# Patient Record
Sex: MALE | HISPANIC OR LATINO | Employment: FULL TIME | URBAN - METROPOLITAN AREA
[De-identification: names, ages, dates, MRNs, and addresses within clinical notes are randomized per-mention and may not be internally consistent; named-entity substitution may affect disease eponyms.]

---

## 2018-01-15 ENCOUNTER — APPOINTMENT (OUTPATIENT)
Dept: RADIOLOGY | Facility: IMAGING CENTER | Age: 53
End: 2018-01-15
Attending: PHYSICIAN ASSISTANT
Payer: COMMERCIAL

## 2018-01-15 ENCOUNTER — OCCUPATIONAL MEDICINE (OUTPATIENT)
Dept: URGENT CARE | Facility: CLINIC | Age: 53
End: 2018-01-15
Payer: COMMERCIAL

## 2018-01-15 VITALS
OXYGEN SATURATION: 98 % | BODY MASS INDEX: 29.02 KG/M2 | RESPIRATION RATE: 16 BRPM | HEIGHT: 64 IN | TEMPERATURE: 97.9 F | HEART RATE: 108 BPM | WEIGHT: 170 LBS

## 2018-01-15 DIAGNOSIS — Z02.89 ENCOUNTER FOR OCCUPATIONAL HEALTH ASSESSMENT: ICD-10-CM

## 2018-01-15 DIAGNOSIS — S96.911A STRAIN OF RIGHT ANKLE, INITIAL ENCOUNTER: ICD-10-CM

## 2018-01-15 DIAGNOSIS — S99.911A INJURY OF RIGHT ANKLE, INITIAL ENCOUNTER: ICD-10-CM

## 2018-01-15 LAB
AMP AMPHETAMINE: NORMAL
BREATH ALCOHOL COMMENT: NORMAL
COC COCAINE: NORMAL
INT CON NEG: NORMAL
INT CON POS: NORMAL
MET METHAMPHETAMINES: NORMAL
OPI OPIATES: NORMAL
PCP PHENCYCLIDINE: NORMAL
POC BREATHALIZER: 0 PERCENT (ref 0–0.01)
POC DRUG COMMENT 753798-OCCUPATIONAL HEALTH: NORMAL
THC: NORMAL

## 2018-01-15 PROCEDURE — 99204 OFFICE O/P NEW MOD 45 MIN: CPT | Mod: 29 | Performed by: PHYSICIAN ASSISTANT

## 2018-01-15 PROCEDURE — 80305 DRUG TEST PRSMV DIR OPT OBS: CPT | Mod: 29 | Performed by: PHYSICIAN ASSISTANT

## 2018-01-15 PROCEDURE — 73610 X-RAY EXAM OF ANKLE: CPT | Mod: TC,FY,RT | Performed by: PHYSICIAN ASSISTANT

## 2018-01-15 PROCEDURE — 82075 ASSAY OF BREATH ETHANOL: CPT | Mod: 29 | Performed by: PHYSICIAN ASSISTANT

## 2018-01-15 ASSESSMENT — ENCOUNTER SYMPTOMS
SENSORY CHANGE: 0
ROS SKIN COMMENTS: NO REDNESS OR BRUISING
FOCAL WEAKNESS: 0
FEVER: 0
TINGLING: 0
CHILLS: 0

## 2018-01-15 ASSESSMENT — PAIN SCALES - GENERAL: PAINLEVEL: 8=MODERATE-SEVERE PAIN

## 2018-01-15 NOTE — PATIENT INSTRUCTIONS
Esguince de tobillo  (Ankle Sprain)   Un esguince de tobillo es ida lesión en los tejidos lucy y fibrosos (ligamentos) que mantienen unidos los huesos de la articulación del tobillo.   CAUSAS   Las causas pueden ser ida caída o la torcedura del tobillo. Los esguinces de tobillo ocurren con más frecuencia al pisar con el borde exterior del pie, lo que hace que el tobillo se vuelva hacia adentro. Las personas que practican deportes son más propensas a justin tipo de lesiones.   SÍNTOMAS   · Dolor en el tobillo. El dolor puede aparecer gaetano el reposo o sólo al tratar de ponerse de pie o caminar.  · Hinchazón.  · Hematomas. Los hematomas pueden aparecer inmediatamente o luego de 1 a 2 días después de la lesión.  · Dificultad para pararse o caminar, especialmente al doblar en esquinas o al cambiar de dirección.  DIAGNÓSTICO   El médico le preguntará detalles acerca de la lesión y le hará un examen físico del tobillo para determinar si tiene un esguince. Gaetano el examen físico, el médico apretará y aplicará presión en áreas específicas del pie y del tobillo. El médico tratará de  el tobillo en ciertas direcciones. Le indicarán ida radiografía para descartar la fractura de un hueso o que un ligamento no se haya separado de mimi de los huesos del tobillo (fractura por avulsión).   TRATAMIENTO   Algunos tipos de soporte podrán ayudarlo a estabilizar el tobillo. El profesional que lo asiste le dará las indicaciones. También podrá indicarle que use medicamentos para calmar el dolor. Si el esguince es grave, langley médico podrá derivarlo a un cirujano que lo ayudará a recuperar la función de las partes afectadas del sistema esquelético (ortopedista) o a un fisioterapeuta.   INSTRUCCIONES PARA EL CUIDADO EN EL HOGAR   · Aplique hielo en la articulación lesionada gaetano 1 ó 2 días o según lo que le indique langley médico. La aplicación del hielo ayuda a reducir la inflamación y el dolor.  ¨ Ponga el hielo en ida bolsa  plástica.  ¨ Colóquese ida toalla entre la piel y la bolsa de hielo.  ¨ Deje el hielo en el lugar gaetano 15 a 20 minutos por vez, cada 2 horas mientras esté despierto.  · Sólo tome medicamentos de venta laila o recetados para calmar el dolor, las molestias o bajar la fiebre según las indicaciones de langley médico.  · Eleve el tobillo lesionado por encima del nivel del corazón tanto lulú pueda gaetano 2 o 3 días.  · Si langley médico le indica el uso de muletas, úselas según las instrucciones. Gradualmente lleve el peso sobre el tobillo afectado. Siga usando muletas o un bastón hasta que pueda caminar sin sentir dolor en el tobillo.  · Si tiene ida férula de yeso, úsela lulú lo indique langley médico. No se apoye en ninguna cosa más dura que ida almohada gaetano las primeras 24 horas. No ponga peso sobre la férula. No permita que se moje. Puede quitársela para ronal ida ducha o un baño.  · Pueden haberle colocado un vendaje elástico para usar alrededor del tobillo para darle soporte. Si el vendaje elástico está muy ajustado (siente adormecimiento u hormigueo o el pie está frío y nain), ajústelo para que sea más cómodo.  · Si usted tiene ida férula de aire, puede soplar o dejar salir el aire para que sea más cómodo. Puede quitarse la férula por la noche y antes de ronal ida ducha o un baño. Mueva los dedos de los pies en la férula varias veces al día para disminuir la hinchazón.  SOLICITE ATENCIÓN MÉDICA SI:   · Le aumenta rápidamente el moretón o el hinchazón.  · Los dedos de los pies están extremadamente fríos o pierde la sensibilidad en el pie.  · El dolor no se alfred con los medicamentos.  SOLICITE ATENCIÓN MÉDICA DE INMEDIATO SI:   · Los dedos de los pies están adormecidos o de color nain.  · Tiene un dolor marcel que va aumentando.  ASEGÚRESE DE QUE:   · Comprende estas instrucciones.  · Controlará langley enfermedad.  · Solicitará ayuda de inmediato si no mejora o empeora.     Esta información no tiene lulú fin reemplazar el  consejo del médico. Asegúrese de hacerle al médico cualquier pregunta que tenga.     Document Released: 12/18/2006 Document Revised: 09/11/2013  Elsevier Interactive Patient Education ©2016 Elsevier Inc.

## 2018-01-15 NOTE — LETTER
"EMPLOYEE’S CLAIM FOR COMPENSATION/ REPORT OF INITIAL TREATMENT  FORM C-4    EMPLOYEE’S CLAIM - PROVIDE ALL INFORMATION REQUESTED   First Name  Philip Last Name  Emile Birthdate                    1965                Sex  male Claim Number   Home Address  [address] Age  52 y.o. Height  1.626 m (5' 4\") Weight  77.1 kg (170 lb) [facility]     [place] Zip  [address] Telephone  [phone] (home)    Mailing Address  [address] Zip  [address] Primary Language Spoken  English    Insurer   Third Party   [employer]   Employee's Occupation (Job Title) When Injury or Occupational Disease Occurred       Employer's Name    [employer]  Telephone   [phone]   Employer Address   [address] [facility]   [address]   Date of Injury  1/15/2018               Hour of Injury  12:00 PM Date Employer Notified  1/15/2018 Last Day of Work after Injury or Occupational Disease  1/15/2018 Supervisor to Whom Injury Reported  VICTOR MANUEL    Address or Location of Accident (if applicable)  [[place] ]   What were you doing at the time of accident? (if applicable)  cleaning     How did this injury or occupational disease occur? (Be specific an answer in detail. Use additional sheet if necessary)  was cleaning and stepped wrong and hurt R foot    If you believe that you have an occupational disease, when did you first have knowledge of the disability and it relationship to your employment?  NA Witnesses to the Accident  NA      Nature of Injury or Occupational Disease  Sprain  Part(s) of Body Injured or Affected  Foot (R), ,     I certify that the above is true and correct to the best of my knowledge and that I have provided this information in order to obtain the benefits of [place] or " [place]).  I hereby authorize any physician, chiropractor, surgeon, practitioner, or other person, any hospital, including [facility] or [facility] hospital, any medical service organization, any insurance company, or other institution or organization to release to each other, any medical or other information, including benefits paid or payable, pertinent to this injury or disease, except information relative to diagnosis, treatment and/or counseling for AIDS, psychological conditions, alcohol or controlled substances, for which I must give specific authorization.  A Photostat of this authorization shall be as valid as the original.     Date 1/15/18   [facility]   Employee’s Signature   THIS REPORT MUST BE COMPLETED AND MAILED WITHIN 3 WORKING DAYS OF TREATMENT   Place  [facility]  Name of Facility  [facility]   Date  1/15/2018 Diagnosis  (S96.911A) Strain of right ankle, initial encounter Is there evidence the injured employee was under the influence of alcohol and/or another controlled substance at the time of accident?   Hour  2:33 PM Description of Injury or Disease  The encounter diagnosis was Strain of right ankle, initial encounter. No   Treatment  RICE, CAM boot, Crutches. OTC NSAIDS.  Have you advised the patient to remain off work five days or more? No   X-Ray Findings  Negative   If Yes   From Date  To Date      From information given by the employee, together with medical evidence, can you directly connect this injury or occupational disease as job incurred?  Yes If No Full Duty  No Modified Duty  Yes   Is additional medical care by a physician indicated?  Yes If Modified Duty, Specify any Limitations / Restrictions  See. D-39 for restrictions   Do you know of any previous injury or disease contributing to this condition or occupational disease?                            No   Date  1/15/2018 Print Doctor’s Name Tamra Dillon P.A.-C. I certify the employer’s  "copy of  this form was mailed on:   Address  [address] Insurer’s Use Only     [facility] Zip  [address]    Provider’s Tax ID Number  [id number] Telephone  Dept: [phone]        mao-PRIYA Newell P.A.-C.   e-Signature: Dr. James Pedersen, Medical Director Degree  PACARLY        ORIGINAL-TREATING PHYSICIAN OR CHIROPRACTOR    PAGE 2-INSURER/TPA    PAGE 3-EMPLOYER    PAGE 4-EMPLOYEE             Form C-4 (rev10/07)              BRIEF DESCRIPTION OF RIGHTS AND BENEFITS  (Pursuant to [place] 616C.050)    Notice of Injury or Occupational Disease (Incident Report Form C-1): If an injury or occupational disease (OD) arises out of and in the  course of employment, you must provide written notice to your employer as soon as practicable, but no later than 7 days after the accident or  OD. Your employer shall maintain a sufficient supply of the required forms.    Claim for Compensation (Form C-4): If medical treatment is sought, the form C-4 is available at the place of initial treatment. A completed  \"Claim for Compensation\" (Form C-4) must be filed within 90 days after an accident or OD. The treating physician or chiropractor must,  within 3 working days after treatment, complete and mail to the employer, the employer's insurer and third-party , the Claim for  Compensation.    Medical Treatment: If you require medical treatment for your on-the-job injury or OD, you may be required to select a physician or  chiropractor from a list provided by your workers’ compensation insurer, if it has contracted with an Organization for Managed Care (MCO) or  Preferred Provider Organization (PPO) or providers of health care. If your employer has not entered into a contract with an MCO or PPO, you  may select a physician or chiropractor from the Panel of Physicians and Chiropractors. Any medical costs related to your industrial injury or  OD will be paid by your insurer.    Temporary Total " Disability (TTD): If your doctor has certified that you are unable to work for a period of at least 5 consecutive days, or 5  cumulative days in a 20-day period, or places restrictions on you that your employer does not accommodate, you may be entitled to TTD  compensation.    Temporary Partial Disability (TPD): If the wage you receive upon reemployment is less than the compensation for TTD to which you are  entitled, the insurer may be required to pay you TPD compensation to make up the difference. TPD can only be paid for a maximum of 24  months.    Permanent Partial Disability (PPD): When your medical condition is stable and there is an indication of a PPD as a result of your injury or  OD, within 30 days, your insurer must arrange for an evaluation by a rating physician or chiropractor to determine the degree of your PPD. The  amount of your PPD award depends on the date of injury, the results of the PPD evaluation and your age and wage.    Permanent Total Disability (PTD): If you are medically certified by a treating physician or chiropractor as permanently and totally disabled  and have been granted a PTD status by your insurer, you are entitled to receive monthly benefits not to exceed 66 2/3% of your average  monthly wage. The amount of your PTD payments is subject to reduction if you previously received a PPD award.    Vocational Rehabilitation Services: You may be eligible for vocational rehabilitation services if you are unable to return to the job due to a  permanent physical impairment or permanent restrictions as a result of your injury or occupational disease.    Transportation and Per Mack Reimbursement: You may be eligible for travel expenses and per mack associated with medical treatment.    Reopening: You may be able to reopen your claim if your condition worsens after claim closure.    Appeal Process: If you disagree with a written determination issued by the insurer or the insurer does not  respond to your request, you may  appeal to the Department of Administration, , by following the instructions contained in your determination letter. You must  appeal the determination within 70 days from the date of the determination letter at [address], [address], [place], [place]  [address], or [address], [address], [place], [place] [address]. If you disagree with the  decision, you may appeal to the  Department of Administration, . You must file your appeal within 30 days from the date of the  decision  letter at [address], [address], [place], [place] [address], or [address], [address], [place], [place] [address]. If you  disagree with a decision of an , you may file a petition for judicial review with the District Court. You must do so within 30  days of the Appeal Officer’s decision. You may be represented by an  at your own expense or you may contact the [facility] for possible  representation.    [place]  for Injured Workers (NAIW): If you disagree with a  decision, you may request that NAIW represent you  without charge at an  Hearing. For information regarding denial of benefits, you may contact the [facility] at: [address], [address], [place] [address], [phone], or [address], [address], [place] [address], [phone]    To File a Complaint with the Division: If you wish to file a complaint with the  of the Division of Industrial Relations (DIR),  please contact the Workers’ Compensation Section, [address], [address], [place], [place] [address], telephone [phone], or  [address], [place] [address], telephone [phone].    For assistance with Workers’ Compensation Issues: you may contact the Office of the [facility] Consumer Health Assistance, [address]  [facility], [address], [place], [place] [address], Toll Free 1-977.141.7621, Web site: http://ivet.Count includes the [facility].[place]., E-mail  Libby@[facility].Count includes the [facility].[place].                                                                                                                                                                                                                                   __________________________________________________________________                                                                   _________________                Employee Name / Signature                                                                                                                                                       Date                                                                                                                                                                                                     D-2 (rev. 10/07)

## 2018-01-15 NOTE — LETTER
[facility] [address] [address] [place] [address]  Phone:  [phone] - Fax:  [phone]   [facility] Progress Report and Disability Certification  Date of Service: 1/15/2018   No Show:  No  Date / Time of Next Visit: 2018   Claim Information   Patient Name: Philip Baptiste  Claim Number:     Employer:   [employer]  Date of Injury: 1/15/2018     Insurer / TPA: [employer]  ID / SSN:     Occupation:    Diagnosis: The encounter diagnosis was Strain of right ankle, initial encounter.    Medical Information   Related to Industrial Injury? Yes    Subjective Complaints:  DOI: 1/15/18. Patient was cleaning at work and stepped over an anchor bolt and twisted right ankle. He suffered an inversion injury. He complains of pain mostly on the lateral aspect of his right ankle. He complains tingling in his toes but no numbness. He denies any previous injury. He is able to bear weight but does have pain.   Objective Findings: Vitals reviewed.  Patient A&O x 3. NAD  Right ankle: moderate edema of lateral malleolus with marked TTP over lateral malleolus and ATFL. Limited ROM with inversion, dorsiflexion and plantar flexion due to pain. NTTP over achilles tendon or medial malleolus. Distal n/v intact. Capillary refill < 2 seconds.   Pre-Existing Condition(s): none   Assessment:   Initial Visit    Status: Additional Care Required  Permanent Disability:No    Plan: Medication  Comments:OTC Ibuprofen or Tylenol. RICE. CAM boot with crutches, Non-weight bearing until follow-up in 3 days    Diagnostics: X-ray  Comments:negative for acture fracture or dislocation    Comments:       Disability Information   Status: Released to Restricted Duty    From:  1/15/2018  Through: 2018 Restrictions are: Temporary   Physical Restrictions   Sitting:    Standing:  < or = to 2 hrs/day Stoopin hrs/day Bendin hrs/day   Squattin hrs/day  Walking:  < or = to 2 hrs/day  Comments:with crutches Climbin hrs/day Pushin hrs/day   Pullin hrs/day Other:    Reaching Above Shoulder (L):   Reaching Above Shoulder (R):       Reaching Below Shoulder (L):    Reaching Below Shoulder (R):      Not to exceed Weight Limits   Carrying(hrs):   Weight Limit(lb): < or = to 10 pounds Lifting(hrs):   Weight  Limit(lb): < or = to 10 pounds   Comments: Patient must be able to wear CAM boot and use crutches at work. Follow-up in 3 days with [facility].    Repetitive Actions   Hands: i.e. Fine Manipulations from Grasping:     Feet: i.e. Operating Foot Controls:     Driving / Operate Machinery:     Physician Name: Priya Dillon P.A.-C. Physician Signature: PRIYA Kay P.A.-C. e-Signature: Dr. James Pedersen, Medical Director   Clinic Name / Location: [address]  [place] [address] Clinic Phone Number: Dept: [phone]   Appointment Time: 1:30 Pm Visit Start Time: 2:33 PM   Check-In Time:  1:43 Pm Visit Discharge Time: 3:30 Pm    Original-Treating Physician or Chiropractor    Page 2-Insurer/TPA    Page 3-Employer    Page 4-Employee

## 2018-01-15 NOTE — PROGRESS NOTES
"Subjective:      Philip Baptiste is a 52 y.o. male who presents with Ankle Injury (was walking and stepped on something and twisted ankle )      DOI: 1/15/18. Patient was cleaning at work and stepped over an anchor bolt and twisted right ankle. He suffered an inversion injury. He complains of pain mostly on the lateral aspect of his right ankle. He complains tingling in his toes but no numbness. He denies any previous injury. He is able to bear weight but does have pain.     HPI     History reviewed. No pertinent past medical history.  History reviewed. No pertinent surgical history.    History reviewed. No pertinent family history.    No Known Allergies    Medications, Allergies, and current problem list reviewed today in Epic      Review of Systems   Constitutional: Negative for chills and fever.   Musculoskeletal: Positive for joint pain (right ankle pain and swelling).   Skin:        No redness or bruising    Neurological: Negative for tingling, sensory change and focal weakness.       All other systems reviewed and are negative.      Objective:     Pulse (!) 108   Temp 36.6 °C (97.9 °F)   Resp 16   Ht 1.626 m (5' 4\")   Wt 77.1 kg (170 lb)   SpO2 98%   BMI 29.18 kg/m²      Physical Exam   Constitutional: He is oriented to person, place, and time. He appears well-developed and well-nourished. No distress.   Pulmonary/Chest: Effort normal. No respiratory distress.   Neurological: He is alert and oriented to person, place, and time. No cranial nerve deficit.   Psychiatric: He has a normal mood and affect. His behavior is normal. Judgment and thought content normal.       Vitals reviewed.  Patient A&O x 3. NAD  Right ankle: moderate edema of lateral malleolus with marked TTP over lateral malleolus and ATFL. Limited ROM with inversion, dorsiflexion and plantar flexion due to pain. NTTP over achilles tendon or medial malleolus. Distal n/v intact. Capillary refill < 2 seconds.    1/15/2018 2:37 " PM    HISTORY/REASON FOR EXAM:  Pain/Deformity Following Trauma  Twisted right ankle today at work    TECHNIQUE/EXAM DESCRIPTION AND NUMBER OF VIEWS:  3 views of the RIGHT ankle.    COMPARISON: None.    FINDINGS:  No acute fracture or dislocation. The ankle mortise is intact.  Vascular calcification.  Mild osteophyte is of the tibiotalar joint.   Impression       No acute osseous abnormality.          Assessment/Plan:     1. Strain of right ankle, initial encounter    - DX-ANKLE 3+ VIEWS RIGHT; Future    RICE, OTC NSAIDS, CAM boot, Crutches. Non-weight bearing x 3 days.  Follow-up with [facility].     Differential diagnoses, Supportive care, and indications for immediate follow-up discussed with patient.   Instructed to return to clinic or nearest emergency department for any change in condition, further concerns, or worsening of symptoms.    The patient demonstrated a good understanding and agreed with the treatment plan.    Tamra Dillon P.A.-C.

## 2018-01-18 ENCOUNTER — OCCUPATIONAL MEDICINE (OUTPATIENT)
Dept: URGENT CARE | Facility: CLINIC | Age: 53
End: 2018-01-18
Payer: COMMERCIAL

## 2018-01-18 VITALS
SYSTOLIC BLOOD PRESSURE: 120 MMHG | HEIGHT: 64 IN | BODY MASS INDEX: 29.02 KG/M2 | HEART RATE: 90 BPM | TEMPERATURE: 98.2 F | RESPIRATION RATE: 16 BRPM | DIASTOLIC BLOOD PRESSURE: 78 MMHG | WEIGHT: 170 LBS | OXYGEN SATURATION: 97 %

## 2018-01-18 DIAGNOSIS — S96.911D STRAIN OF RIGHT ANKLE, SUBSEQUENT ENCOUNTER: ICD-10-CM

## 2018-01-18 PROCEDURE — 99213 OFFICE O/P EST LOW 20 MIN: CPT | Mod: 29 | Performed by: NURSE PRACTITIONER

## 2018-01-18 ASSESSMENT — ENCOUNTER SYMPTOMS
HEADACHES: 0
SHORTNESS OF BREATH: 0
SORE THROAT: 0
MYALGIAS: 0
DIZZINESS: 0
VOMITING: 0
DIARRHEA: 0
NAUSEA: 0
FEVER: 0
CHILLS: 0
PALPITATIONS: 0
COUGH: 0

## 2018-01-18 NOTE — PROGRESS NOTES
"Subjective:      Philip Baptiste is a 52 y.o. male who presents with Ankle Injury (right ankle, wc f/u, doing little better)    Chief Complaint   Patient presents with   • Ankle Injury     right ankle, wc f/u, doing little better         Pt states he is still having swelling and pain in his right ankle. It causes him pain when he tries to walk on it. Taking motrin and putting ice on it. He is trying to use a crutch and walking boot.     HPI    Review of Systems   Constitutional: Negative for chills, fever and malaise/fatigue.   HENT: Negative for congestion and sore throat.    Respiratory: Negative for cough and shortness of breath.    Cardiovascular: Negative for chest pain and palpitations.   Gastrointestinal: Negative for diarrhea, nausea and vomiting.   Genitourinary: Negative for dysuria.   Musculoskeletal: Positive for joint pain. Negative for myalgias.   Skin: Negative for rash.   Neurological: Negative for dizziness and headaches.          Objective:     /78   Pulse 90   Temp 36.8 °C (98.2 °F)   Resp 16   Ht 1.626 m (5' 4\")   Wt 77.1 kg (170 lb)   SpO2 97%   BMI 29.18 kg/m²      Physical Exam   Constitutional: He is oriented to person, place, and time. He appears well-developed and well-nourished. No distress.   HENT:   Head: Normocephalic and atraumatic.   Cardiovascular: Normal rate.    Pulmonary/Chest: No respiratory distress. He has no wheezes.   Musculoskeletal:        Right ankle: He exhibits decreased range of motion and swelling. Tenderness. Lateral malleolus and medial malleolus tenderness found.   Lymphadenopathy:     He has no cervical adenopathy.   Neurological: He is alert and oriented to person, place, and time.   Skin: Skin is warm and dry.   Psychiatric: He has a normal mood and affect. His behavior is normal. Judgment and thought content normal.   Nursing note and vitals reviewed.      Moderate edema around lateral malleolus. Pain over palpation of the lateral malleolus. " Pain with flexion, dorsiflexion and inversion. Good circulation       Assessment/Plan:     1. Strain of right ankle, subsequent encounter    See D39

## 2018-01-18 NOTE — LETTER
[facility] [address] [place] [address]  Phone:  [phone] - Fax:  [phone]   [facility] Progress Report and Disability Certification  Date of Service: 2018   No Show:  No  Date / Time of Next Visit: 2018   Claim Information   Patient Name: Philip Baptiste  Claim Number:     Employer:   [employer]  Date of Injury: 1/15/2018     Insurer / TPA: [employer]  ID / SSN: [id number]    Occupation:    Diagnosis: The encounter diagnosis was Strain of right ankle, subsequent encounter.    Medical Information   Related to Industrial Injury? Yes    Subjective Complaints:  Pt states he is still having swelling and pain in his right ankle. It causes him pain when he tries to walk on it. Taking motrin and putting ice on it. He is trying to use a crutch and walking boot.   Objective Findings: Moderate edema around lateral malleolus. Pain over palpation of the lateral malleolus. Pain with flexion, dorsiflexion and inversion. Good circulation   Pre-Existing Condition(s):     Assessment:   Condition Same    Status: Additional Care Required  Permanent Disability:No    Plan: Medication    Diagnostics: X-ray  Comments:Xray was negative for acutre fracture    Comments:       Disability Information   Status: Released to Restricted Duty    From:  2018  Through: 2018 Restrictions are:     Physical Restrictions   Sitting:    Standin hrs/day Stooping:    Bending:  < or = to 2 hrs/day   Squatting:  < or = to 2 hrs/day Walking:    Comments:with crutches as tolerated Climbin hrs/day Pushing:  < or = to 2 hrs/day   Pulling:  < or = to 2 hrs/day Other:    Reaching Above Shoulder (L):   Reaching Above Shoulder (R):       Reaching Below Shoulder (L):    Reaching Below Shoulder (R):      Not to exceed Weight Limits   Carrying(hrs):   Weight Limit(lb): < or = to 10 pounds Lifting(hrs):   Weight  Limit(lb): < or = to 10  tami   Comments: Recheck in 5 days.  Using walking boot if ambulating, if to painful use crutches and air cast. ROM light exercises and stretches. NSAIDS and ICE     Repetitive Actions   Hands: i.e. Fine Manipulations from Grasping:     Feet: i.e. Operating Foot Controls:     Driving / Operate Machinery:     Physician Name: DIMITRY Heredia Physician Signature: PRIYA Elam e-Signature: Dr. James Pedersen, Medical Director   Clinic Name / Location: [address] [place] [address] Clinic Phone Number: Dept: [phone]   Appointment Time: 1:00 Pm Visit Start Time: 1:00 PM   Check-In Time:  12:28 Pm Visit Discharge Time: 1:16 Pm    Original-Treating Physician or Chiropractor    Page 2-Insurer/TPA    Page 3-Employer    Page 4-Employee

## 2018-01-23 ENCOUNTER — OCCUPATIONAL MEDICINE (OUTPATIENT)
Dept: URGENT CARE | Facility: CLINIC | Age: 53
End: 2018-01-23
Payer: COMMERCIAL

## 2018-01-23 VITALS
TEMPERATURE: 98 F | WEIGHT: 170 LBS | HEIGHT: 64 IN | RESPIRATION RATE: 20 BRPM | BODY MASS INDEX: 29.02 KG/M2 | OXYGEN SATURATION: 98 % | HEART RATE: 98 BPM | DIASTOLIC BLOOD PRESSURE: 80 MMHG | SYSTOLIC BLOOD PRESSURE: 120 MMHG

## 2018-01-23 DIAGNOSIS — S96.911S STRAIN OF RIGHT ANKLE, SEQUELA: ICD-10-CM

## 2018-01-23 PROCEDURE — 99213 OFFICE O/P EST LOW 20 MIN: CPT | Mod: 29 | Performed by: NURSE PRACTITIONER

## 2018-01-23 ASSESSMENT — ENCOUNTER SYMPTOMS
INABILITY TO BEAR WEIGHT: 0
SHORTNESS OF BREATH: 0
DIZZINESS: 0
NAUSEA: 0
VOMITING: 0
FEVER: 0
EYE PAIN: 0
CHILLS: 0
MUSCLE WEAKNESS: 0
TINGLING: 0
MYALGIAS: 0
NUMBNESS: 0
LOSS OF SENSATION: 0
SORE THROAT: 0

## 2018-01-23 NOTE — LETTER
[facility] [address] [place] [address]  Phone:  [phone] - Fax:  [phone]   [facility] Progress Report and Disability Certification  Date of Service: 1/23/2018   No Show:  No  Date / Time of Next Visit: 1/26/2018   Claim Information   Patient Name: Philip Baptiste  Claim Number:     Employer:    Date of Injury: 1/15/2018     Insurer / TPA: [employer]  ID / SSN:     Occupation:    Diagnosis: There were no encounter diagnoses.    Medical Information   Related to Industrial Injury? Yes    Subjective Complaints:  DOI: 1/15/18. Patient was cleaning at work and stepped over an anchor bolt and twisted right ankle. He suffered an inversion injury. Patient feels he is improving. He complains of pain mostly on the medial aspect of his right ankle now. He denies any tingling in his toes and no numbness. He denies any previous injury. He is able to bear weight but does have pain he has been using crutches and wearing the ankle  brace and taking ibuprofen for pain. He's been icing and elevating with good pain relief. Patient has not been back to work since incident.   Objective Findings: Positive for joint pain.  He exhibits decreased range of motion and with no swelling. Tenderness noted on Lateral malleolus and medial malleolus tenderness found  Skin: No redness or bruising .    Pre-Existing Condition(s):     Assessment:   Condition Improved    Status: Additional Care Required  Permanent Disability:No    Plan:      Diagnostics:      Comments:  Advised to continue using crutches, wear ankle splint, ice, elevate, ibuprofen for pain. Weightbearing as tolerated. Will have patient follow up with occupational health providers for following visits.    Disability Information   Status: Released to Restricted Duty    From:  1/23/2018  Through: 1/26/2018 Restrictions are: Temporary   Physical Restrictions   Sitting:     Standing:    Stooping:    Bending:      Squatting:    Walking:    Climbing:    Pushing:      Pulling:    Other:    Reaching Above Shoulder (L):   Reaching Above Shoulder (R):       Reaching Below Shoulder (L):    Reaching Below Shoulder (R):      Not to exceed Weight Limits   Carrying(hrs):   Weight Limit(lb):   Lifting(hrs):   Weight  Limit(lb):     Comments:      Repetitive Actions   Hands: i.e. Fine Manipulations from Grasping:     Feet: i.e. Operating Foot Controls:     Driving / Operate Machinery:     Physician Name: EMEKA Vo Physician Signature: CHAPIN Syed e-Signature: Dr. James Pedersen, Medical Director   Clinic Name / Location: [address] [place] [address] Clinic Phone Number: Dept: [phone]   Appointment Time: 10:00 Am Visit Start Time: 9:50 AM   Check-In Time:  9:43 Am Visit Discharge Time:     Original-Treating Physician or Chiropractor    Page 2-Insurer/TPA    Page 3-Employer    Page 4-Employee

## 2018-01-23 NOTE — PROGRESS NOTES
Subjective:     Philip Baptiste is a 52 y.o. male who presents for Ankle Injury (FUP Right anklle injury)    DOI: 1/15/18. Patient was cleaning at work and stepped over an anchor bolt and twisted right ankle. He suffered an inversion injury. Patient feels he is improving. He complains of pain mostly on the medial aspect of his right ankle now. He denies any tingling in his toes and no numbness. He denies any previous injury. He is able to bear weight but does have pain he has been using crutches and wearing the ankle  brace and taking ibuprofen for pain. He's been icing and elevating with good pain relief. Patient has not been back to work since incident.  Ankle Injury    The incident occurred more than 1 week ago. The incident occurred at work. The injury mechanism was an inversion injury. The pain is present in the right ankle. The quality of the pain is described as aching. The pain is at a severity of 4/10. The pain is mild. The pain has been constant since onset. Pertinent negatives include no inability to bear weight, loss of sensation, muscle weakness, numbness or tingling. He reports no foreign bodies present. The symptoms are aggravated by weight bearing and movement. He has tried acetaminophen, elevation, immobilization and rest for the symptoms. The treatment provided mild relief.   No past medical history on file.No past surgical history on file.  Social History     Social History   • Marital status: Single     Spouse name: N/A   • Number of children: N/A   • Years of education: N/A     Occupational History   • Not on file.     Social History Main Topics   • Smoking status: Never Smoker   • Smokeless tobacco: Never Used   • Alcohol use Yes      Comment: once in a while    • Drug use: No   • Sexual activity: Not on file     Other Topics Concern   • Not on file     Social History Narrative   • No narrative on file    No family history on file. Review of Systems   Constitutional: Negative for chills  "and fever.   HENT: Negative for sore throat.    Eyes: Negative for pain.   Respiratory: Negative for shortness of breath.    Cardiovascular: Negative for chest pain.   Gastrointestinal: Negative for nausea and vomiting.   Genitourinary: Negative for hematuria.   Musculoskeletal: Positive for joint pain. Negative for myalgias.   Skin: Negative for rash.   Neurological: Negative for dizziness, tingling and numbness.   No Known Allergies   Objective:   /80   Pulse 98   Temp 36.7 °C (98 °F)   Resp 20   Ht 1.626 m (5' 4\")   Wt 77.1 kg (170 lb)   SpO2 98%   BMI 29.18 kg/m²   Physical Exam   Constitutional: He is oriented to person, place, and time. He appears well-developed and well-nourished. No distress.   HENT:   Head: Normocephalic and atraumatic.   Eyes: Conjunctivae and EOM are normal. Pupils are equal, round, and reactive to light.   Cardiovascular: Normal rate and regular rhythm.    No murmur heard.  Pulmonary/Chest: Effort normal and breath sounds normal. No respiratory distress.   Abdominal: Soft. He exhibits no distension. There is no tenderness.   Musculoskeletal:        Right foot: There is decreased range of motion and tenderness. There is no swelling and no deformity.   He exhibits decreased range of motion and with no swelling. Tenderness noted on Lateral malleolus and medial malleolus tenderness found   No redness or bruising .    Neurological: He is alert and oriented to person, place, and time. He has normal reflexes. No sensory deficit.   Skin: Skin is warm and dry.   Psychiatric: He has a normal mood and affect.     Positive for joint pain.  He exhibits decreased range of motion and with no swelling. Tenderness noted on Lateral malleolus and medial malleolus tenderness found  Skin: No redness or bruising .    Assessment/Plan:   Assessment    1. Strain of right ankle, sequela  Relative rest, ice, nsaid prn. Elevation and compression prn swelling. Resume activity as tolerated.    See " D-39.    Patient given precautionary s/sx that mandate immediate follow up and evaluation in the ED. Advised of risks of not doing so.    DDX, Supportive care, and indications for immediate follow-up discussed with patient.    Instructed to return to clinic or nearest emergency department if we are not available for any change in condition, further concerns, or worsening of symptoms.    The patient demonstrated a good understanding and agreed with the treatment plan.

## 2018-01-23 NOTE — LETTER
[facility] [place]  [address] [place] [address]  Phone:  [phone] - Fax:  [phone]   [facility] Progress Report and Disability Certification  Date of Service: 1/23/2018   No Show:  No  Date / Time of Next Visit: 1/26/2018@10:10AM   Claim Information   Patient Name: Philip Baptiste  Claim Number:  N/A   Employer:  [employer] Date of Injury: 1/15/2018     Insurer / TPA: [employer]  ID / SSN:     Occupation:    Diagnosis: The encounter diagnosis was Strain of right ankle, sequela.    Medical Information   Related to Industrial Injury? Yes    Subjective Complaints:  DOI: 1/15/18. Patient was cleaning at work and stepped over an anchor bolt and twisted right ankle. He suffered an inversion injury. Patient feels he is improving. He complains of pain mostly on the medial aspect of his right ankle now. He denies any tingling in his toes and no numbness. He denies any previous injury. He is able to bear weight but does have pain he has been using crutches and wearing the ankle  brace and taking ibuprofen for pain. He's been icing and elevating with good pain relief. Patient has not been back to work since incident.   Objective Findings: Positive for joint pain.  He exhibits decreased range of motion and with no swelling. Tenderness noted on Lateral malleolus and medial malleolus tenderness found  Skin: No redness or bruising .    Pre-Existing Condition(s):     Assessment:   Condition Improved    Status: Additional Care Required  Permanent Disability:No    Plan:      Diagnostics:      Comments:  Advised to continue using crutches, wear ankle splint, ice, elevate, ibuprofen for pain. Weightbearing as tolerated. Will have patient follow up with occupational health providers for following visits.    Disability Information   Status: Released to Restricted Duty    From:  1/23/2018  Through: 1/26/2018 Restrictions are: Temporary     Physical Restrictions   Sitting:    Standing:    Stooping:    Bending:      Squatting:    Walking:    Climbing:    Pushing:      Pulling:    Other:    Reaching Above Shoulder (L):   Reaching Above Shoulder (R):       Reaching Below Shoulder (L):    Reaching Below Shoulder (R):      Not to exceed Weight Limits   Carrying(hrs):   Weight Limit(lb):   Lifting(hrs):   Weight  Limit(lb):     Comments:      Repetitive Actions   Hands: i.e. Fine Manipulations from Grasping:     Feet: i.e. Operating Foot Controls:     Driving / Operate Machinery:     Physician Name: EMEKA Vo Physician Signature: CHAPIN Syed e-Signature: Dr. James Pedersen, Medical Director   Clinic Name / Location: [address] [place] [address] Clinic Phone Number: Dept: [phone]   Appointment Time: 10:00 Am Visit Start Time: 9:50 AM   Check-In Time:  9:43 Am Visit Discharge Time: 10:10AM   Original-Treating Physician or Chiropractor    Page 2-Insurer/TPA    Page 3-Employer    Page 4-Employee

## 2018-01-26 ENCOUNTER — OCCUPATIONAL MEDICINE (OUTPATIENT)
Dept: OCCUPATIONAL MEDICINE | Facility: CLINIC | Age: 53
End: 2018-01-26
Payer: COMMERCIAL

## 2018-01-26 VITALS
OXYGEN SATURATION: 98 % | BODY MASS INDEX: 25.76 KG/M2 | WEIGHT: 170 LBS | DIASTOLIC BLOOD PRESSURE: 82 MMHG | HEIGHT: 68 IN | SYSTOLIC BLOOD PRESSURE: 118 MMHG | RESPIRATION RATE: 16 BRPM | TEMPERATURE: 98 F | HEART RATE: 97 BPM

## 2018-01-26 DIAGNOSIS — S93.401D SPRAIN OF RIGHT ANKLE, UNSPECIFIED LIGAMENT, SUBSEQUENT ENCOUNTER: ICD-10-CM

## 2018-01-26 PROCEDURE — 99203 OFFICE O/P NEW LOW 30 MIN: CPT | Performed by: PREVENTIVE MEDICINE

## 2018-01-26 NOTE — LETTER
[address],   [address] [place] [address]  Phone:  [phone] - Fax:  [phone]   [facility] Progress Report and Disability Certification  Date of Service: 1/26/2018   No Show:  No  Date / Time of Next Visit: 2/6/2018@10:10AM    Claim Information   Patient Name: Philip Baptiste  Claim Number:     Employer:   [employer] Date of Injury: 1/15/2018     Insurer / TPA: [employer]  ID / SSN:     Occupation:    Diagnosis: The encounter diagnosis was Sprain of right ankle, unspecified ligament, subsequent encounter.    Medical Information   Related to Industrial Injury? Yes    Subjective Complaints:  DOI 1/15/2018: 53 yo male presents regarding right ankle injury. Patient was cleaning at work and stepped over an anchor bolt and twisted right ankle. He was seen in the urgent care, x-rays of right ankle were negative. Patient noted gradual improvement since injury day. Patient was switched to Aircast splint at last visit. He states that it is more comfortable. He has been using the crutches all the time up until today and has been trying without crutches today. He has pain with ambulation. Pain on both medial and lateral parts of ankle. Pain does not radiate. Denies any numbness or tingling. Using ibuprofen, ice and elevation for comfort.   Objective Findings: Right ankle: No gross deformity. Tenderness over the medial and lateral ankles. No Achilles defect felt. Decreased in plantar and dorsiflexion. Able to bear some weight. Antalgic gait.   Pre-Existing Condition(s):     Assessment:   Condition Improved    Status: Additional Care Required  Permanent Disability:No    Plan:      Diagnostics:      Comments:  Weight-bear as tolerated, gradually wean off crutches  Ibuprofen and/or Tylenol as needed for pain  Recommend to continue to ice and elevate ankle as needed. Recommend gentle range of motion exercises  Continue  restricted duty  Follow-up one week    Disability Information   Status: Released to Restricted Duty    From:  1/26/2018  Through: 2/6/2018 Restrictions are: Temporary   Physical Restrictions   Sitting:    Standing:  < or = to 2 hrs/day Stooping:    Bending:      Squatting:    Walking:  < or = to 2 hrs/day Climbing:    Pushing:  < or = to 1 hr/day   Pulling:  < or = to 1 hr/day Other:    Reaching Above Shoulder (L):   Reaching Above Shoulder (R):       Reaching Below Shoulder (L):    Reaching Below Shoulder (R):      Not to exceed Weight Limits   Carrying(hrs):   Weight Limit(lb): < or = to 10 pounds Lifting(hrs):   Weight  Limit(lb): < or = to 10 pounds   Comments: Allow to use crutches at work as needed. Seated work 75% of time    Repetitive Actions   Hands: i.e. Fine Manipulations from Grasping:     Feet: i.e. Operating Foot Controls:     Driving / Operate Machinery:     Physician Name: Beka Lowe D.O. Physician Signature: BEKA Griffin D.O. e-Signature: Dr. James Pedersen, Medical Director   Clinic Name / Location: [address],   [address] [place] [address] Clinic Phone Number: Dept: [phone]   Appointment Time: 10:10 Am Visit Start Time: 10:05 AM   Check-In Time:  10:01 Am Visit Discharge Time:  10:21AM    Original-Treating Physician or Chiropractor    Page 2-Insurer/TPA    Page 3-Employer    Page 4-Employee

## 2018-01-26 NOTE — PROGRESS NOTES
"Subjective:      Philip Baptiste is a 52 y.o. male who presents with Follow-Up (WC DOI Rt ankle feeling better room25)      DOI 1/15/2018: 51 yo male presents regarding right ankle injury. Patient was cleaning at work and stepped over an anchor bolt and twisted right ankle. He was seen in the urgent care, x-rays of right ankle were negative. Patient noted gradual improvement since injury day. Patient was switched to Aircast splint at last visit. He states that it is more comfortable. He has been using the crutches all the time up until today and has been trying without crutches today. He has pain with ambulation. Pain on both medial and lateral parts of ankle. Pain does not radiate. Denies any numbness or tingling. Using ibuprofen, ice and elevation for comfort.     HPI    ROS  ROS: All systems were reviewed on intake form, form was reviewed and signed. See scanned documents in media. Pertinent positives and negatives included in HPI.    PMH: No pertinent past medical history to this problem  MEDS: Medications were reviewed in Epic  ALLERGIES: No Known Allergies  SOCHX: Works as a labror  FH: No pertinent family history to this problem     Objective:     /82   Pulse 97   Temp 36.7 °C (98 °F)   Resp 16   Ht 1.727 m (5' 8\")   Wt 77.1 kg (170 lb)   SpO2 98%   BMI 25.85 kg/m²      Physical Exam   Constitutional: He is oriented to person, place, and time. He appears well-developed and well-nourished.   HENT:   Right Ear: External ear normal.   Left Ear: External ear normal.   Eyes: Conjunctivae and EOM are normal.   Cardiovascular: Normal rate.    Pulmonary/Chest: Effort normal. No respiratory distress.   Neurological: He is alert and oriented to person, place, and time.   Skin: Skin is warm and dry.   Psychiatric: He has a normal mood and affect. Judgment normal.       Right ankle: No gross deformity. Tenderness over the medial and lateral ankles. No Achilles defect felt. Decreased in plantar and " dorsiflexion. Able to bear some weight. Antalgic gait.       Assessment/Plan:     1. Sprain of right ankle, unspecified ligament, subsequent encounter    Weight-bear as tolerated, gradually wean off crutches  Ibuprofen and/or Tylenol as needed for pain  Recommend to continue to ice and elevate ankle as needed. Recommend gentle range of motion exercises  Continue restricted duty  Follow-up one week

## 2018-02-06 ENCOUNTER — OCCUPATIONAL MEDICINE (OUTPATIENT)
Dept: OCCUPATIONAL MEDICINE | Facility: CLINIC | Age: 53
End: 2018-02-06
Payer: COMMERCIAL

## 2018-02-06 VITALS
SYSTOLIC BLOOD PRESSURE: 132 MMHG | BODY MASS INDEX: 25.76 KG/M2 | HEIGHT: 68 IN | DIASTOLIC BLOOD PRESSURE: 80 MMHG | RESPIRATION RATE: 14 BRPM | OXYGEN SATURATION: 95 % | WEIGHT: 170 LBS | TEMPERATURE: 97.3 F | HEART RATE: 83 BPM

## 2018-02-06 DIAGNOSIS — S93.401D SPRAIN OF RIGHT ANKLE, UNSPECIFIED LIGAMENT, SUBSEQUENT ENCOUNTER: ICD-10-CM

## 2018-02-06 PROCEDURE — 99213 OFFICE O/P EST LOW 20 MIN: CPT | Performed by: PREVENTIVE MEDICINE

## 2018-02-06 RX ORDER — DICLOFENAC SODIUM 75 MG/1
75 TABLET, DELAYED RELEASE ORAL 2 TIMES DAILY
Qty: 60 TAB | Refills: 0 | Status: SHIPPED | OUTPATIENT
Start: 2018-02-06

## 2018-02-06 ASSESSMENT — ENCOUNTER SYMPTOMS
FOCAL WEAKNESS: 0
TINGLING: 0
SENSORY CHANGE: 0

## 2018-02-06 ASSESSMENT — PAIN SCALES - GENERAL: PAINLEVEL: 7=MODERATE-SEVERE PAIN

## 2018-02-06 NOTE — LETTER
[address],   [address] [place] [address]  Phone:  [phone] - Fax:  [phone]   [facility] Progress Report and Disability Certification  Date of Service: 2/6/2018   No Show:  No  Date / Time of Next Visit: 2/14/2018 @ 10am   Claim Information   Patient Name: Philip Baptiste  Claim Number:     Employer:   [employer] Date of Injury: 1/15/2018     Insurer / TPA: [employer]  ID / SSN:     Occupation:    Diagnosis: The encounter diagnosis was Sprain of right ankle, unspecified ligament, subsequent encounter.    Medical Information   Related to Industrial Injury? Yes    Subjective Complaints:  DOI 1/15/2018: 53 yo male presents regarding right ankle injury. Patient was cleaning at work and stepped over an anchor bolt and twisted right ankle. He was seen in the urgent care, x-rays of right ankle were negative. Patient states that overall he feels improved however he still has pain with ambulation and some swelling on the medial ankle. States he is able to walk a little bit better. He has been taking ibuprofen and icing.   Objective Findings: Right ankle: No gross deformity. Tenderness over the medial medial ankle. No Achilles defect felt. Slightly decreased plantar and dorsiflexion. Able to bear some weight. Antalgic gait.   Pre-Existing Condition(s):     Assessment:   Condition Improved    Status: Additional Care Required  Permanent Disability:No    Plan:      Diagnostics:      Comments:  Prescribed diclofenac  Continue icing and elevation is able  Continue restricted duty  Follow-up one week    Disability Information   Status: Released to Restricted Duty    From:  2/6/2018  Through: 2/14/2018 Restrictions are: Temporary   Physical Restrictions   Sitting:    Standing:  < or = to 4 hrs/day Stooping:    Bending:      Squatting:    Walking:  < or = to 4 hrs/day Climbing:    Pushing:      Pulling:    Other:    Reaching  Above Shoulder (L):   Reaching Above Shoulder (R):       Reaching Below Shoulder (L):    Reaching Below Shoulder (R):      Not to exceed Weight Limits   Carrying(hrs):   Weight Limit(lb): < or = to 25 pounds Lifting(hrs):   Weight  Limit(lb): < or = to 25 pounds   Comments:      Repetitive Actions   Hands: i.e. Fine Manipulations from Grasping:     Feet: i.e. Operating Foot Controls:     Driving / Operate Machinery:     Physician Name: Beka Lowe D.O. Physician Signature: BEKA Griffin D.O. e-Signature: Dr. James Pedersen, Medical Director   Clinic Name / Location: [address],   [address] [place] [address] Clinic Phone Number: Dept: [phone]   Appointment Time: 9:30 Am Visit Start Time: 9:54 AM   Check-In Time:  9:52 Am Visit Discharge Time:  10:17am   Original-Treating Physician or Chiropractor    Page 2-Insurer/TPA    Page 3-Employer    Page 4-Employee

## 2018-02-06 NOTE — PROGRESS NOTES
"Subjective:      Philip Baptiste is a 52 y.o. male who presents with Follow-Up ( DOI 1/15/2018  - R Ankle - better- room 2)      DOI 1/15/2018: 51 yo male presents regarding right ankle injury. Patient was cleaning at work and stepped over an anchor bolt and twisted right ankle. He was seen in the urgent care, x-rays of right ankle were negative. Patient states that overall he feels improved however he still has pain with ambulation and some swelling on the medial ankle. States he is able to walk a little bit better. He has been taking ibuprofen and icing.     HPI    Review of Systems   Neurological: Negative for tingling, sensory change and focal weakness.     SOCHX: Works as a   FH: No pertinent family history to this problem.       Objective:     /80   Pulse 83   Temp 36.3 °C (97.3 °F)   Resp 14   Ht 1.727 m (5' 8\")   Wt 77.1 kg (170 lb)   SpO2 95%   BMI 25.85 kg/m²      Physical Exam   Constitutional: He is oriented to person, place, and time. He appears well-developed and well-nourished.   Cardiovascular: Normal rate.    Pulmonary/Chest: Effort normal.   Neurological: He is alert and oriented to person, place, and time.   Skin: Skin is warm and dry.   Psychiatric: He has a normal mood and affect.       Right ankle: No gross deformity. Tenderness over the medial medial ankle. No Achilles defect felt. Slightly decreased plantar and dorsiflexion. Able to bear some weight. Antalgic gait.       Assessment/Plan:     1. Sprain of right ankle, unspecified ligament, subsequent encounter  - diclofenac EC (VOLTAREN) 75 MG Tablet Delayed Response; Take 1 Tab by mouth 2 times a day.  Dispense: 60 Tab; Refill: 0    Prescribed diclofenac  Continue icing and elevation is able  Continue restricted duty  Follow-up one week  "

## 2018-02-14 ENCOUNTER — OCCUPATIONAL MEDICINE (OUTPATIENT)
Dept: OCCUPATIONAL MEDICINE | Facility: CLINIC | Age: 53
End: 2018-02-14
Payer: COMMERCIAL

## 2018-02-14 VITALS
RESPIRATION RATE: 14 BRPM | DIASTOLIC BLOOD PRESSURE: 86 MMHG | HEART RATE: 86 BPM | TEMPERATURE: 97.3 F | SYSTOLIC BLOOD PRESSURE: 120 MMHG | OXYGEN SATURATION: 97 % | HEIGHT: 68 IN | WEIGHT: 170 LBS | BODY MASS INDEX: 25.76 KG/M2

## 2018-02-14 DIAGNOSIS — S93.401D SPRAIN OF RIGHT ANKLE, UNSPECIFIED LIGAMENT, SUBSEQUENT ENCOUNTER: ICD-10-CM

## 2018-02-14 PROCEDURE — 99212 OFFICE O/P EST SF 10 MIN: CPT | Performed by: PREVENTIVE MEDICINE

## 2018-02-14 ASSESSMENT — PAIN SCALES - GENERAL: PAINLEVEL: 2=MINIMAL-SLIGHT

## 2018-02-14 NOTE — PROGRESS NOTES
"Subjective:      Philip Baptiste is a 52 y.o. male who presents with Follow-Up ( DOI 1/15/2018  - R Ankle  - Better- ROOM 2)      DOI 1/15/2018: 51 yo male presents regarding right ankle injury. Patient was cleaning at work and stepped over an anchor bolt and twisted right ankle. He was seen in the urgent care, x-rays of right ankle were negative. Patient states that overall his right ankle pain is much improved. He states he only has minimal pain with walking. He states he is ready for full duty.     HPI    ROS       Objective:     /86   Pulse 86   Temp 36.3 °C (97.3 °F)   Resp 14   Ht 1.727 m (5' 8\")   Wt 77.1 kg (170 lb)   SpO2 97%   BMI 25.85 kg/m²      Physical Exam    Right ankle: No gross deformity. Very minimal tenderness over medial ankle. No Achilles defect felt. Full range of motion. Normal gait.       Assessment/Plan:     1. Sprain of right ankle, unspecified ligament, subsequent encounter  Released from care  Full duty  Expect complete resolution of symptoms in the next 2-3 weeks, if symptoms do not resolve or if symptoms worsen return to clinic      "

## 2018-02-14 NOTE — LETTER
[address],   [address] [place] [address]  Phone:  [phone] - Fax:  [phone]   [facility] Progress Report and Disability Certification  Date of Service: 2/14/2018   No Show:  No  Date / Time of Next Visit:  MMI   Claim Information   Patient Name: Philip Baptiste  Claim Number:     Employer:   [employer]  Date of Injury: 1/15/2018     Insurer / TPA: [employer]  ID / SSN:     Occupation:    Diagnosis: The encounter diagnosis was Sprain of right ankle, unspecified ligament, subsequent encounter.    Medical Information   Related to Industrial Injury? Yes    Subjective Complaints:  DOI 1/15/2018: 51 yo male presents regarding right ankle injury. Patient was cleaning at work and stepped over an anchor bolt and twisted right ankle. He was seen in the urgent care, x-rays of right ankle were negative. Patient states that overall his right ankle pain is much improved. He states he only has minimal pain with walking. He states he is ready for full duty.   Objective Findings: Right ankle: No gross deformity. Very minimal tenderness over medial ankle. No Achilles defect felt. Full range of motion. Normal gait.   Pre-Existing Condition(s):     Assessment:   Condition Improved    Status: Discharged /  MMI  Permanent Disability:No    Plan:      Diagnostics:      Comments:  Released from care  Full duty  Expect complete resolution of symptoms in the next 2-3 weeks, if symptoms do not resolve or if symptoms worsen return to clinic    Disability Information   Status: Released to Full Duty    From:  2/14/2018  Through:   Restrictions are:     Physical Restrictions   Sitting:    Standing:    Stooping:    Bending:      Squatting:    Walking:    Climbing:    Pushing:      Pulling:    Other:    Reaching Above Shoulder (L):   Reaching Above Shoulder (R):       Reaching Below Shoulder (L):    Reaching Below Shoulder (R):      Not to  exceed Weight Limits   Carrying(hrs):   Weight Limit(lb):   Lifting(hrs):   Weight  Limit(lb):     Comments:      Repetitive Actions   Hands: i.e. Fine Manipulations from Grasping:     Feet: i.e. Operating Foot Controls:     Driving / Operate Machinery:     Physician Name: Beak Lowe D.O. Physician Signature: anhSignTABEKA VÁSQUEZ D.O. e-Signature: Dr. James Pedersen, Medical Director   Clinic Name / Location: [address],   [address] [place] [address] Clinic Phone Number: Dept: [phone]   Appointment Time: 10:00 Am Visit Start Time: 10:00 AM   Check-In Time:  9:59 Am Visit Discharge Time:  10:13 AM   Original-Treating Physician or Chiropractor    Page 2-Insurer/TPA    Page 3-Employer    Page 4-Employee